# Patient Record
Sex: MALE | Race: WHITE | Employment: FULL TIME | URBAN - METROPOLITAN AREA
[De-identification: names, ages, dates, MRNs, and addresses within clinical notes are randomized per-mention and may not be internally consistent; named-entity substitution may affect disease eponyms.]

---

## 2018-09-06 ENCOUNTER — OFFICE VISIT (OUTPATIENT)
Dept: ORTHOPEDIC SURGERY | Age: 53
End: 2018-09-06

## 2018-09-06 VITALS
HEART RATE: 73 BPM | WEIGHT: 170 LBS | SYSTOLIC BLOOD PRESSURE: 128 MMHG | BODY MASS INDEX: 26.68 KG/M2 | HEIGHT: 67 IN | DIASTOLIC BLOOD PRESSURE: 76 MMHG

## 2018-09-06 DIAGNOSIS — M25.511 RIGHT SHOULDER PAIN, UNSPECIFIED CHRONICITY: ICD-10-CM

## 2018-09-06 DIAGNOSIS — S43.101A SEPARATION OF RIGHT ACROMIOCLAVICULAR JOINT, INITIAL ENCOUNTER: Primary | ICD-10-CM

## 2018-09-06 PROCEDURE — 99203 OFFICE O/P NEW LOW 30 MIN: CPT | Performed by: ORTHOPAEDIC SURGERY

## 2018-09-11 ENCOUNTER — OFFICE VISIT (OUTPATIENT)
Dept: ORTHOPEDIC SURGERY | Age: 53
End: 2018-09-11

## 2018-09-11 VITALS
BODY MASS INDEX: 26.68 KG/M2 | DIASTOLIC BLOOD PRESSURE: 70 MMHG | WEIGHT: 170 LBS | HEIGHT: 67 IN | HEART RATE: 70 BPM | SYSTOLIC BLOOD PRESSURE: 121 MMHG

## 2018-09-11 DIAGNOSIS — S43.101A SEPARATION OF RIGHT ACROMIOCLAVICULAR JOINT, INITIAL ENCOUNTER: ICD-10-CM

## 2018-09-11 DIAGNOSIS — M25.511 RIGHT SHOULDER PAIN, UNSPECIFIED CHRONICITY: Primary | ICD-10-CM

## 2018-09-11 PROCEDURE — L3670 SO ACRO/CLAV CAN WEB PRE OTS: HCPCS | Performed by: ORTHOPAEDIC SURGERY

## 2018-09-11 PROCEDURE — 99213 OFFICE O/P EST LOW 20 MIN: CPT | Performed by: ORTHOPAEDIC SURGERY
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
## 2018-09-20 ENCOUNTER — ANESTHESIA EVENT (OUTPATIENT)
Dept: OPERATING ROOM | Age: 53
End: 2018-09-20
Payer: COMMERCIAL

## 2018-09-20 NOTE — PROGRESS NOTES
PRE-OP INSTRUCTIONS FOR THE SURGICAL PATIENT YOU ARE UNABLE TO MAKE CONTACT FOR AN INTERVIEW:      1. Follow instructions for your ARRIVAL TIME as DIRECTED BY YOUR SURGEON. 2. Enter the MAIN entrance located on [address] and report to the desk. 3. Bring your insurance & prescription card and photo ID with you. You may also be asked to pay a co-pay, as you may want to bring a check or credit card with you. 4. Leave all other valuables at home. 5. Arrange for someone to drive you home and be with you for the first 24 hours after discharge. 6. You must contact your surgeon for ALL medication instructions, especially if taking blood thinners, aspirin, or diabetic medication. 7. A Pre-op History and Physical for surgery MUST be completed by your Physician or an Urgent Care within 30 days of your procedure date. Please bring a copy with you on the day of your procedure and along with any other testing performed. 8. DO NOT EAT OR DRINK ANYTHING AFTER MIDNIGHT, including gum, candy, mints or ice chips   9. Dress in loose, comfortable clothing appropriate for redressing after your procedure. Do not wear jewelry (including body piercings), make-up, fingernail polish, lotion, powders or metal hairclips. Contacts will need to be removed prior to surgery. 10. If you use a CPAP, please bring it with you on the day of your procedure. 11. Do not shave or wax for 72 hours prior to procedure near your operative site  12. FOR WOMAN OF CHILDBEARING AGE ONLY- please bring a urine sample with you on day of surgery or make sure we can collect on arrival.    If you have further questions, you may contact us at [phone]    Left instructions on patient's voicemail. Harika Vazquez. 9/20/2018 .7:37 AM

## 2018-09-21 ENCOUNTER — APPOINTMENT (OUTPATIENT)
Dept: GENERAL RADIOLOGY | Age: 53
End: 2018-09-21
Attending: ORTHOPAEDIC SURGERY
Payer: COMMERCIAL

## 2018-09-21 ENCOUNTER — ANESTHESIA (OUTPATIENT)
Dept: OPERATING ROOM | Age: 53
End: 2018-09-21
Payer: COMMERCIAL

## 2018-09-21 ENCOUNTER — HOSPITAL ENCOUNTER (OUTPATIENT)
Age: 53
Setting detail: OUTPATIENT SURGERY
Discharge: HOME | End: 2018-09-21
Attending: ORTHOPAEDIC SURGERY | Admitting: ORTHOPAEDIC SURGERY
Payer: COMMERCIAL

## 2018-09-21 VITALS
HEIGHT: 67 IN | SYSTOLIC BLOOD PRESSURE: 118 MMHG | HEART RATE: 52 BPM | RESPIRATION RATE: 15 BRPM | TEMPERATURE: 97.4 F | WEIGHT: 170 LBS | DIASTOLIC BLOOD PRESSURE: 76 MMHG | BODY MASS INDEX: 26.68 KG/M2 | OXYGEN SATURATION: 99 %

## 2018-09-21 VITALS
OXYGEN SATURATION: 100 % | DIASTOLIC BLOOD PRESSURE: 58 MMHG | SYSTOLIC BLOOD PRESSURE: 90 MMHG | RESPIRATION RATE: 16 BRPM | TEMPERATURE: 96.3 F

## 2018-09-21 DIAGNOSIS — Z98.890 S/P ARTHROSCOPY OF RIGHT SHOULDER: Primary | ICD-10-CM

## 2018-09-21 PROBLEM — S43.51XS: Status: ACTIVE | Noted: 2018-09-21

## 2018-09-21 PROCEDURE — 2720000010 HC SURG SUPPLY STERILE: Performed by: ORTHOPAEDIC SURGERY

## 2018-09-21 PROCEDURE — 94760 N-INVAS EAR/PLS OXIMETRY 1: CPT

## 2018-09-21 PROCEDURE — 73020 X-RAY EXAM OF SHOULDER: CPT

## 2018-09-21 PROCEDURE — 6360000002 HC RX W HCPCS: Performed by: NURSE ANESTHETIST, CERTIFIED REGISTERED

## 2018-09-21 PROCEDURE — 3600000004 HC SURGERY LEVEL 4 BASE: Performed by: ORTHOPAEDIC SURGERY

## 2018-09-21 PROCEDURE — 2580000003 HC RX 258: Performed by: ANESTHESIOLOGY

## 2018-09-21 PROCEDURE — 7100000001 HC PACU RECOVERY - ADDTL 15 MIN: Performed by: ORTHOPAEDIC SURGERY

## 2018-09-21 PROCEDURE — 2720000001 HC MISC SURG SUPPLY STERILE $51-500: Performed by: ORTHOPAEDIC SURGERY

## 2018-09-21 PROCEDURE — 94660 CPAP INITIATION&MGMT: CPT

## 2018-09-21 PROCEDURE — 2700000000 HC OXYGEN THERAPY PER DAY

## 2018-09-21 PROCEDURE — 6360000002 HC RX W HCPCS: Performed by: ANESTHESIOLOGY

## 2018-09-21 PROCEDURE — 2500000003 HC RX 250 WO HCPCS: Performed by: NURSE ANESTHETIST, CERTIFIED REGISTERED

## 2018-09-21 PROCEDURE — 2709999900 HC NON-CHARGEABLE SUPPLY: Performed by: ORTHOPAEDIC SURGERY

## 2018-09-21 PROCEDURE — 3209999900 FLUORO FOR SURGICAL PROCEDURES

## 2018-09-21 PROCEDURE — 76942 ECHO GUIDE FOR BIOPSY: CPT | Performed by: ANESTHESIOLOGY

## 2018-09-21 PROCEDURE — 7100000011 HC PHASE II RECOVERY - ADDTL 15 MIN: Performed by: ORTHOPAEDIC SURGERY

## 2018-09-21 PROCEDURE — 2580000003 HC RX 258: Performed by: ORTHOPAEDIC SURGERY

## 2018-09-21 PROCEDURE — 6360000002 HC RX W HCPCS: Performed by: ORTHOPAEDIC SURGERY

## 2018-09-21 PROCEDURE — 3700000000 HC ANESTHESIA ATTENDED CARE: Performed by: ORTHOPAEDIC SURGERY

## 2018-09-21 PROCEDURE — C1776 JOINT DEVICE (IMPLANTABLE): HCPCS | Performed by: ORTHOPAEDIC SURGERY

## 2018-09-21 PROCEDURE — 3700000001 HC ADD 15 MINUTES (ANESTHESIA): Performed by: ORTHOPAEDIC SURGERY

## 2018-09-21 PROCEDURE — L3650 SO 8 ABD RESTRAINT PRE OTS: HCPCS | Performed by: ORTHOPAEDIC SURGERY

## 2018-09-21 PROCEDURE — 7100000010 HC PHASE II RECOVERY - FIRST 15 MIN: Performed by: ORTHOPAEDIC SURGERY

## 2018-09-21 PROCEDURE — C9290 INJ, BUPIVACAINE LIPOSOME: HCPCS | Performed by: ANESTHESIOLOGY

## 2018-09-21 PROCEDURE — C1713 ANCHOR/SCREW BN/BN,TIS/BN: HCPCS | Performed by: ORTHOPAEDIC SURGERY

## 2018-09-21 PROCEDURE — 3600000014 HC SURGERY LEVEL 4 ADDTL 15MIN: Performed by: ORTHOPAEDIC SURGERY

## 2018-09-21 PROCEDURE — 7100000000 HC PACU RECOVERY - FIRST 15 MIN: Performed by: ORTHOPAEDIC SURGERY

## 2018-09-21 DEVICE — GRAFT HUM TISS ANT TIBIALIS TEND >22CM FRZN (FOLDED DIAM: Type: IMPLANTABLE DEVICE | Site: SHOULDER | Status: FUNCTIONAL

## 2018-09-21 DEVICE — KIT ENDOSCP 2MM TIGERTAPE LOOP 3MM CANN DRL SUTLASS SD WIRE: Type: IMPLANTABLE DEVICE | Site: SHOULDER | Status: FUNCTIONAL

## 2018-09-21 RX ORDER — ONDANSETRON 2 MG/ML
INJECTION INTRAMUSCULAR; INTRAVENOUS PRN
Status: DISCONTINUED | OUTPATIENT
Start: 2018-09-21 | End: 2018-09-21 | Stop reason: SDUPTHER

## 2018-09-21 RX ORDER — MIDAZOLAM HYDROCHLORIDE 1 MG/ML
INJECTION INTRAMUSCULAR; INTRAVENOUS
Status: COMPLETED
Start: 2018-09-21 | End: 2018-09-21

## 2018-09-21 RX ORDER — DEXAMETHASONE SODIUM PHOSPHATE 4 MG/ML
INJECTION, SOLUTION INTRA-ARTICULAR; INTRALESIONAL; INTRAMUSCULAR; INTRAVENOUS; SOFT TISSUE PRN
Status: DISCONTINUED | OUTPATIENT
Start: 2018-09-21 | End: 2018-09-21 | Stop reason: SDUPTHER

## 2018-09-21 RX ORDER — DOCUSATE SODIUM 100 MG/1
100 CAPSULE, LIQUID FILLED ORAL 2 TIMES DAILY
Qty: 40 CAPSULE | Refills: 0 | Status: SHIPPED | OUTPATIENT
Start: 2018-09-21 | End: 2018-11-01

## 2018-09-21 RX ORDER — DIPHENHYDRAMINE HYDROCHLORIDE 50 MG/ML
12.5 INJECTION INTRAMUSCULAR; INTRAVENOUS
Status: DISCONTINUED | OUTPATIENT
Start: 2018-09-21 | End: 2018-09-21 | Stop reason: HOSPADM

## 2018-09-21 RX ORDER — SODIUM CHLORIDE, SODIUM LACTATE, POTASSIUM CHLORIDE, CALCIUM CHLORIDE 600; 310; 30; 20 MG/100ML; MG/100ML; MG/100ML; MG/100ML
INJECTION, SOLUTION INTRAVENOUS CONTINUOUS
Status: DISCONTINUED | OUTPATIENT
Start: 2018-09-21 | End: 2018-09-21 | Stop reason: HOSPADM

## 2018-09-21 RX ORDER — LABETALOL HYDROCHLORIDE 5 MG/ML
5 INJECTION, SOLUTION INTRAVENOUS EVERY 10 MIN PRN
Status: DISCONTINUED | OUTPATIENT
Start: 2018-09-21 | End: 2018-09-21 | Stop reason: HOSPADM

## 2018-09-21 RX ORDER — OXYCODONE HYDROCHLORIDE 5 MG/1
10 TABLET ORAL PRN
Status: DISCONTINUED | OUTPATIENT
Start: 2018-09-21 | End: 2018-09-21 | Stop reason: HOSPADM

## 2018-09-21 RX ORDER — OXYCODONE HYDROCHLORIDE 5 MG/1
5 TABLET ORAL PRN
Status: DISCONTINUED | OUTPATIENT
Start: 2018-09-21 | End: 2018-09-21 | Stop reason: HOSPADM

## 2018-09-21 RX ORDER — HYDRALAZINE HYDROCHLORIDE 20 MG/ML
5 INJECTION INTRAMUSCULAR; INTRAVENOUS EVERY 10 MIN PRN
Status: DISCONTINUED | OUTPATIENT
Start: 2018-09-21 | End: 2018-09-21 | Stop reason: HOSPADM

## 2018-09-21 RX ORDER — METOCLOPRAMIDE HYDROCHLORIDE 5 MG/ML
10 INJECTION INTRAMUSCULAR; INTRAVENOUS
Status: DISCONTINUED | OUTPATIENT
Start: 2018-09-21 | End: 2018-09-21 | Stop reason: HOSPADM

## 2018-09-21 RX ORDER — MORPHINE SULFATE 2 MG/ML
1 INJECTION, SOLUTION INTRAMUSCULAR; INTRAVENOUS EVERY 5 MIN PRN
Status: DISCONTINUED | OUTPATIENT
Start: 2018-09-21 | End: 2018-09-21 | Stop reason: HOSPADM

## 2018-09-21 RX ORDER — ROCURONIUM BROMIDE 10 MG/ML
INJECTION, SOLUTION INTRAVENOUS PRN
Status: DISCONTINUED | OUTPATIENT
Start: 2018-09-21 | End: 2018-09-21 | Stop reason: SDUPTHER

## 2018-09-21 RX ORDER — OXYCODONE HYDROCHLORIDE AND ACETAMINOPHEN 5; 325 MG/1; MG/1
1 TABLET ORAL EVERY 4 HOURS PRN
Qty: 42 TABLET | Refills: 0 | Status: SHIPPED | OUTPATIENT
Start: 2018-09-21 | End: 2018-09-28

## 2018-09-21 RX ORDER — MIDAZOLAM HYDROCHLORIDE 1 MG/ML
INJECTION INTRAMUSCULAR; INTRAVENOUS PRN
Status: DISCONTINUED | OUTPATIENT
Start: 2018-09-21 | End: 2018-09-21 | Stop reason: SDUPTHER

## 2018-09-21 RX ORDER — GLYCOPYRROLATE 1 MG/5 ML
SYRINGE (ML) INTRAVENOUS PRN
Status: DISCONTINUED | OUTPATIENT
Start: 2018-09-21 | End: 2018-09-21 | Stop reason: SDUPTHER

## 2018-09-21 RX ORDER — PROMETHAZINE HYDROCHLORIDE 25 MG/ML
6.25 INJECTION, SOLUTION INTRAMUSCULAR; INTRAVENOUS
Status: DISCONTINUED | OUTPATIENT
Start: 2018-09-21 | End: 2018-09-21 | Stop reason: HOSPADM

## 2018-09-21 RX ORDER — MEPERIDINE HYDROCHLORIDE 25 MG/ML
12.5 INJECTION INTRAMUSCULAR; INTRAVENOUS; SUBCUTANEOUS EVERY 5 MIN PRN
Status: DISCONTINUED | OUTPATIENT
Start: 2018-09-21 | End: 2018-09-21 | Stop reason: HOSPADM

## 2018-09-21 RX ORDER — LIDOCAINE HYDROCHLORIDE 20 MG/ML
INJECTION, SOLUTION EPIDURAL; INFILTRATION; INTRACAUDAL; PERINEURAL PRN
Status: DISCONTINUED | OUTPATIENT
Start: 2018-09-21 | End: 2018-09-21 | Stop reason: SDUPTHER

## 2018-09-21 RX ORDER — FENTANYL CITRATE 50 UG/ML
INJECTION, SOLUTION INTRAMUSCULAR; INTRAVENOUS PRN
Status: DISCONTINUED | OUTPATIENT
Start: 2018-09-21 | End: 2018-09-21 | Stop reason: SDUPTHER

## 2018-09-21 RX ORDER — SODIUM CHLORIDE 0.9 % (FLUSH) 0.9 %
10 SYRINGE (ML) INJECTION PRN
Status: DISCONTINUED | OUTPATIENT
Start: 2018-09-21 | End: 2018-09-21 | Stop reason: HOSPADM

## 2018-09-21 RX ORDER — PROPOFOL 10 MG/ML
INJECTION, EMULSION INTRAVENOUS PRN
Status: DISCONTINUED | OUTPATIENT
Start: 2018-09-21 | End: 2018-09-21 | Stop reason: SDUPTHER

## 2018-09-21 RX ORDER — LIDOCAINE HYDROCHLORIDE 10 MG/ML
1 INJECTION, SOLUTION EPIDURAL; INFILTRATION; INTRACAUDAL; PERINEURAL
Status: DISCONTINUED | OUTPATIENT
Start: 2018-09-21 | End: 2018-09-21 | Stop reason: HOSPADM

## 2018-09-21 RX ORDER — FENTANYL CITRATE 50 UG/ML
INJECTION, SOLUTION INTRAMUSCULAR; INTRAVENOUS
Status: COMPLETED
Start: 2018-09-21 | End: 2018-09-21

## 2018-09-21 RX ORDER — SODIUM CHLORIDE 0.9 % (FLUSH) 0.9 %
10 SYRINGE (ML) INJECTION EVERY 12 HOURS SCHEDULED
Status: DISCONTINUED | OUTPATIENT
Start: 2018-09-21 | End: 2018-09-21 | Stop reason: HOSPADM

## 2018-09-21 RX ADMIN — FENTANYL CITRATE 100 MCG: 50 INJECTION INTRAMUSCULAR; INTRAVENOUS at 10:00

## 2018-09-21 RX ADMIN — PHENYLEPHRINE HYDROCHLORIDE 100 MCG: 10 INJECTION, SOLUTION INTRAMUSCULAR; INTRAVENOUS; SUBCUTANEOUS at 11:47

## 2018-09-21 RX ADMIN — Medication 2 G: at 11:34

## 2018-09-21 RX ADMIN — LIDOCAINE HYDROCHLORIDE 100 MG: 20 INJECTION, SOLUTION EPIDURAL; INFILTRATION; INTRACAUDAL; PERINEURAL at 11:21

## 2018-09-21 RX ADMIN — SODIUM CHLORIDE, SODIUM LACTATE, POTASSIUM CHLORIDE, AND CALCIUM CHLORIDE: 600; 310; 30; 20 INJECTION, SOLUTION INTRAVENOUS at 11:38

## 2018-09-21 RX ADMIN — PROPOFOL 150 MG: 10 INJECTION, EMULSION INTRAVENOUS at 11:21

## 2018-09-21 RX ADMIN — SODIUM CHLORIDE, SODIUM LACTATE, POTASSIUM CHLORIDE, AND CALCIUM CHLORIDE: 600; 310; 30; 20 INJECTION, SOLUTION INTRAVENOUS at 13:34

## 2018-09-21 RX ADMIN — Medication 0.2 MG: at 11:18

## 2018-09-21 RX ADMIN — MIDAZOLAM HYDROCHLORIDE 2 MG: 1 INJECTION INTRAMUSCULAR; INTRAVENOUS at 11:15

## 2018-09-21 RX ADMIN — ROCURONIUM BROMIDE 10 MG: 10 INJECTION, SOLUTION INTRAVENOUS at 11:21

## 2018-09-21 RX ADMIN — MIDAZOLAM HYDROCHLORIDE 2 MG: 1 INJECTION INTRAMUSCULAR; INTRAVENOUS at 10:00

## 2018-09-21 RX ADMIN — FENTANYL CITRATE 50 MCG: 50 INJECTION INTRAMUSCULAR; INTRAVENOUS at 13:34

## 2018-09-21 RX ADMIN — BUPIVACAINE 10 ML: 13.3 INJECTION, SUSPENSION, LIPOSOMAL INFILTRATION at 10:02

## 2018-09-21 RX ADMIN — ROCURONIUM BROMIDE 20 MG: 10 INJECTION, SOLUTION INTRAVENOUS at 12:56

## 2018-09-21 RX ADMIN — ROCURONIUM BROMIDE 40 MG: 10 INJECTION, SOLUTION INTRAVENOUS at 11:22

## 2018-09-21 RX ADMIN — ONDANSETRON 4 MG: 2 INJECTION INTRAMUSCULAR; INTRAVENOUS at 13:26

## 2018-09-21 RX ADMIN — SODIUM CHLORIDE, SODIUM LACTATE, POTASSIUM CHLORIDE, AND CALCIUM CHLORIDE: 600; 310; 30; 20 INJECTION, SOLUTION INTRAVENOUS at 09:20

## 2018-09-21 RX ADMIN — SUGAMMADEX 150 MG: 100 INJECTION, SOLUTION INTRAVENOUS at 13:36

## 2018-09-21 RX ADMIN — DEXAMETHASONE SODIUM PHOSPHATE 8 MG: 4 INJECTION, SOLUTION INTRAMUSCULAR; INTRAVENOUS at 13:26

## 2018-09-21 ASSESSMENT — PULMONARY FUNCTION TESTS
PIF_VALUE: 17
PIF_VALUE: 16
PIF_VALUE: 17
PIF_VALUE: 16
PIF_VALUE: 1
PIF_VALUE: 17
PIF_VALUE: 3
PIF_VALUE: 17
PIF_VALUE: 1
PIF_VALUE: 15
PIF_VALUE: 17
PIF_VALUE: 17
PIF_VALUE: 14
PIF_VALUE: 2
PIF_VALUE: 17
PIF_VALUE: 15
PIF_VALUE: 17
PIF_VALUE: 16
PIF_VALUE: 16
PIF_VALUE: 5
PIF_VALUE: 15
PIF_VALUE: 17
PIF_VALUE: 16
PIF_VALUE: 14
PIF_VALUE: 14
PIF_VALUE: 16
PIF_VALUE: 17
PIF_VALUE: 16
PIF_VALUE: 4
PIF_VALUE: 16
PIF_VALUE: 14
PIF_VALUE: 5
PIF_VALUE: 15
PIF_VALUE: 17
PIF_VALUE: 14
PIF_VALUE: 17
PIF_VALUE: 2
PIF_VALUE: 16
PIF_VALUE: 17
PIF_VALUE: 15
PIF_VALUE: 16
PIF_VALUE: 17
PIF_VALUE: 16
PIF_VALUE: 16
PIF_VALUE: 17
PIF_VALUE: 11
PIF_VALUE: 17
PIF_VALUE: 17
PIF_VALUE: 14
PIF_VALUE: 16
PIF_VALUE: 17
PIF_VALUE: 8
PIF_VALUE: 16
PIF_VALUE: 16
PIF_VALUE: 17
PIF_VALUE: 15
PIF_VALUE: 17
PIF_VALUE: 1
PIF_VALUE: 17
PIF_VALUE: 16
PIF_VALUE: 16
PIF_VALUE: 17
PIF_VALUE: 16
PIF_VALUE: 17
PIF_VALUE: 16
PIF_VALUE: 15
PIF_VALUE: 16
PIF_VALUE: 15
PIF_VALUE: 17
PIF_VALUE: 16
PIF_VALUE: 16
PIF_VALUE: 17
PIF_VALUE: 4
PIF_VALUE: 17
PIF_VALUE: 16
PIF_VALUE: 17
PIF_VALUE: 15
PIF_VALUE: 2
PIF_VALUE: 16
PIF_VALUE: 2
PIF_VALUE: 20
PIF_VALUE: 17
PIF_VALUE: 17
PIF_VALUE: 14
PIF_VALUE: 16
PIF_VALUE: 5
PIF_VALUE: 2
PIF_VALUE: 17
PIF_VALUE: 16
PIF_VALUE: 16
PIF_VALUE: 2
PIF_VALUE: 2
PIF_VALUE: 16
PIF_VALUE: 17
PIF_VALUE: 15
PIF_VALUE: 17
PIF_VALUE: 1
PIF_VALUE: 15
PIF_VALUE: 1
PIF_VALUE: 16
PIF_VALUE: 17
PIF_VALUE: 18
PIF_VALUE: 17
PIF_VALUE: 16
PIF_VALUE: 17
PIF_VALUE: 16
PIF_VALUE: 16
PIF_VALUE: 15
PIF_VALUE: 17
PIF_VALUE: 15
PIF_VALUE: 17
PIF_VALUE: 17
PIF_VALUE: 16
PIF_VALUE: 15
PIF_VALUE: 17
PIF_VALUE: 14
PIF_VALUE: 14
PIF_VALUE: 2
PIF_VALUE: 17
PIF_VALUE: 16
PIF_VALUE: 17
PIF_VALUE: 17
PIF_VALUE: 18
PIF_VALUE: 4
PIF_VALUE: 15
PIF_VALUE: 2
PIF_VALUE: 16

## 2018-09-21 ASSESSMENT — PAIN SCALES - GENERAL
PAINLEVEL_OUTOF10: 0

## 2018-09-21 ASSESSMENT — PAIN DESCRIPTION - DESCRIPTORS: DESCRIPTORS: ACHING

## 2018-09-21 ASSESSMENT — PAIN - FUNCTIONAL ASSESSMENT: PAIN_FUNCTIONAL_ASSESSMENT: 0-10

## 2018-09-21 NOTE — ANESTHESIA PROCEDURE NOTES
Peripheral Block    Patient location during procedure: pre-op  Staffing  Anesthesiologist: Hannah Ash  Performed: anesthesiologist   Preanesthetic Checklist  Completed: patient identified, site marked, surgical consent, pre-op evaluation, timeout performed, IV checked, risks and benefits discussed, monitors and equipment checked, anesthesia consent given, oxygen available and patient being monitored  Peripheral Block  Patient position: sitting  Prep: ChloraPrep  Patient monitoring: cardiac monitor, continuous pulse ox, frequent blood pressure checks and IV access  Block type: Brachial plexus  Laterality: right  Injection technique: single-shot  Procedures: ultrasound guided  Interscalene  Provider prep: mask and sterile gloves  Needle  Needle gauge: 22 G  Needle length: 8 cm  Needle localization: ultrasound guidance  Assessment  Injection assessment: negative aspiration for heme, no paresthesia on injection and local visualized surrounding nerve on ultrasound  Paresthesia pain: none  Slow fractionated injection: yes  Hemodynamics: stable  Additional Notes  Right Ultrasound-Guided Interscalene Brachial Plexus Block    Indication: Postoperative analgesia upon request of the attending surgeon. Procedure: Informed consent obtained and pre-procedural timeout procedure performed. Patient supine in semi-upright position. Landmarks identified. Sterile prep. Right brachial plexus was identified using an ultrasound probe and an 80mm 22G insulated regional block needle was inserted posterior to the right interscalene groove at the level of the C6 tubercle and directed in an anterior manner toward the brachial plexus. The needle was visualized on ultrasound as it entered the plexus sheath. Exparel 10cc was diluted with normal saline 20cc and was then injected in 5cc increments to a total volume of 30cc after aspiration was performed prior to each increment and found to be negative for both heme and CSF.   Block was tolerated well by the patient. There were no apparent complications at the time of the block. Reason for block: post-op pain management and at surgeon's request            Supriya Hill.  Mason Henry MD  September 21, 2018 10:31 AM

## 2018-09-21 NOTE — ANESTHESIA PRE PROCEDURE
Department of Anesthesiology  Preprocedure Note       Name:  Je Husbands   Age:  45 y.o.  :  1965                                          MRN:  [id number]         Date:  2018      Surgeon: Edward Bynum):  Sampson Arguello MD    Procedure: Procedure(s):  RIGHT SHOULDER ARTHROSCOPY, ARTHROSCOPIC DEBRIDEMENT ACROMIOCLAVICULAR JOINT RESECTION WITH ALLOGRAFT    Medications prior to admission:   Prior to Admission medications    Not on File       Current medications:    Current Facility-Administered Medications   Medication Dose Route Frequency Provider Last Rate Last Dose    HYDROmorphone (DILAUDID) injection 0.25 mg  0.25 mg Intravenous Q5 Min PRN Cass Velasco MD        HYDROmorphone (DILAUDID) injection 0.5 mg  0.5 mg Intravenous Q5 Min PRN Cass Velasco MD        morphine injection 1 mg  1 mg Intravenous Q5 Min PRN Cass Velasco MD        HYDROmorphone (DILAUDID) injection 0.5 mg  0.5 mg Intravenous Q5 Min PRN Cass Velasco MD        oxyCODONE (ROXICODONE) immediate release tablet 5 mg  5 mg Oral PRN Cass Velasco MD        Or    oxyCODONE (ROXICODONE) immediate release tablet 10 mg  10 mg Oral PRN Cass Velasco MD        diphenhydrAMINE (BENADRYL) injection 12.5 mg  12.5 mg Intravenous Once PRN Cass Velasco MD        metoclopramide (REGLAN) injection 10 mg  10 mg Intravenous Once PRN Cass Velasco MD        promethazine (PHENERGAN) injection 6.25 mg  6.25 mg Intravenous Once PRN Cass Velasco MD        labetalol (NORMODYNE;TRANDATE) injection 5 mg  5 mg Intravenous Q10 Min PRN Cass Velasco MD        hydrALAZINE (APRESOLINE) injection 5 mg  5 mg Intravenous Q10 Min PRN Cass Velasco MD        meperidine (DEMEROL) injection 12.5 mg  12.5 mg Intravenous Q5 Min PRN Cass Velasco MD           Allergies:  No Known Allergies    Problem List:    Patient Active Problem List   Diagnosis Code    S/P arthroscopy of right shoulder Z98.890       Past Medical History:  No past medical history on file. Past Surgical History:        Procedure Laterality Date    HIP SURGERY      JOINT REPLACEMENT Right     THR    SHOULDER ARTHROSCOPY      SHOULDER SURGERY Left     RCR       Social History:    Social History   Substance Use Topics    Smoking status: Never Smoker    Smokeless tobacco: Never Used    Alcohol use No                                Counseling given: Not Answered      Vital Signs (Current):   Vitals:    09/20/18 0739 09/21/18 0829   BP:  117/74   Pulse:  68   Resp:  18   Temp:  98.2 °F (36.8 °C)   TempSrc:  Oral   SpO2:  100%   Weight: 170 lb (77.1 kg) 170 lb (77.1 kg)   Height: 5' 7\" (1.702 m) 5' 7\" (1.702 m)                                              BP Readings from Last 3 Encounters:   09/21/18 117/74   09/11/18 121/70   09/06/18 128/76       NPO Status:                                                                                 BMI:   Wt Readings from Last 3 Encounters:   09/21/18 170 lb (77.1 kg)   09/11/18 170 lb (77.1 kg)   09/06/18 170 lb (77.1 kg)     Body mass index is 26.63 kg/m². CBC: No results found for: WBC, RBC, HGB, HCT, MCV, RDW, PLT    CMP: No results found for: NA, K, CL, CO2, BUN, CREATININE, GFRAA, AGRATIO, LABGLOM, GLUCOSE, PROT, CALCIUM, BILITOT, ALKPHOS, AST, ALT    POC Tests: No results for input(s): POCGLU, POCNA, POCK, POCCL, POCBUN, POCHEMO, POCHCT in the last 72 hours.     Coags: No results found for: PROTIME, INR, APTT    HCG (If Applicable): No results found for: PREGTESTUR, PREGSERUM, HCG, HCGQUANT     ABGs: No results found for: PHART, PO2ART, VUJ1ZXA, NRW2JWD, BEART, F4DUQJJU     Type & Screen (If Applicable):  No results found for: LABABO, [address]    Anesthesia Evaluation  Patient summary reviewed and Nursing notes reviewed no history of anesthetic complications:   Airway: Mallampati: II  TM distance: >3 FB   Neck ROM: full  Mouth opening: > = 3 FB Dental:          Pulmonary:Negative Pulmonary ROS Cardiovascular:Negative CV ROS                      Neuro/Psych:   Negative Neuro/Psych ROS              GI/Hepatic/Renal: Neg GI/Hepatic/Renal ROS            Endo/Other: Negative Endo/Other ROS                    Abdominal:           Vascular: negative vascular ROS. Anesthesia Plan      general     ASA 3    (47-year-old male presents for RIGHT SHOULDER ARTHROSCOPY, ARTHROSCOPIC DEBRIDEMENT ACROMIOCLAVICULAR JOINT RESECTION WITH ALLOGRAFT. Plan general anesthesia with ASA standard monitors; interscalene brachial plexus block for postoperative analgesia upon request of the attending surgeon. Questions answered. Patient agreeable with anesthetic plan.   )  Induction: intravenous. Anesthetic plan and risks discussed with patient. Plan discussed with CRNA.     Attending anesthesiologist reviewed and agrees with Pre Eval content      John Garcia MD   9/21/2018
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
## 2018-09-21 NOTE — PROGRESS NOTES
Admitted to PACU bed 9. Placed on cardiac monitor. Report received from CRNA. No unusual events noted from CRNA.

## 2018-09-21 NOTE — PROGRESS NOTES
Ambulatory Surgery/Procedure Discharge Note    Vitals:    09/21/18 1506   BP: 118/76   Pulse: 52   Resp: 15   Temp: 97.4 °F (36.3 °C)   SpO2: 99%       No intake/output data recorded. Pain assessment:   Pain Level: 0    Patient discharged to home/self care. Patient discharged via wheel chair by transporter to waiting family/S.O. Discharge instructions along with medication instructions and side effects given to patient and wife. Verbalized understanding. Denies nausea. Pain controlled. Tolerating po fluids. Denies need to void at this time. Feels ready to go home.       9/21/2018 3:08 PM

## 2018-09-22 NOTE — OP NOTE
[address] [facility], [address] [facility]                                 OPERATIVE REPORT    PATIENT NAME: Emma Valencia              :        1965  MED REC NO:   [id number]                          ROOM:  ACCOUNT NO:   [de-identified]                           ADMIT DATE: 2018  PROVIDER:     Amy Palma MD    DATE OF PROCEDURE:  2018    PREOPERATIVE DIAGNOSIS:  Grade IIIB AC joint separation, right elbow. POSTOPERATIVE DIAGNOSIS:  Grade IIIB AC joint separation, right elbow with  partial thickness supraspinatus tendon tear. OPERATIONS PERFORMED:  Right shoulder examination under anesthesia;  diagnostic arthroscopy; arthroscopic extensive debridement of rotator  interval and rotator cuff; arthroscopic assisted AC joint reconstruction  using EndoButton, TightRope type construct with augmentation using  allograft, anterior tibialis tendon. ANESTHESIA:  General anesthesia, interscalene block. IV FLUIDS:  Per anesthesia record. ESTIMATED BLOOD LOSS:  25 mL. COMPLICATIONS:  None. SURGEON:  Amy Palma MD    ASSISTANT:  Clarissa Foreman DO    IMPLANTS:  Dog Bone from [employer] x2. FINDINGS:  Examination under anesthesia revealed ballotable and reducible  grade III AC separation. It seemed to be displaced a little bit posterior  through the deltotrapezial fascia. Diagnostic arthroscopy revealed a low  grade and a medium grade supraspinatus tendon tear measuring about 1 cm,  amenable to debridement. There was not a high grade tear. A little bit of  labral fraying was also debrided. The rotator interval was scarred and we  could open up. We had excellent exposure of the base of coracoid and  labral tear, AC joint reconstruction and reduced it. We were able to pass  a graft cerclage around the clavicle and around the cord. He had very good  fixation and augmentation.   There were no other obvious introduced into the glenohumeral joint through a standard  posterior portal.  Systematic diagnostic arthroscopy was performed with  findings as above. We established an anterior portal over the rotator  interval.  We inserted our arthroscopic shaver across a metal cannula. This was used to debride. We probed the biceps, then we did labral  debridement. We also debrided the rotator cuff where there was some  supraspinatus fraying. This was done down to a stable margin. We used  cautery to penny the exposed footprint, it was about 4 to 5 mm. There was  no gross biceps instability. We used cautery, opened up the rotator  interval down to the base of the coracoid and then also began resecting the  scar and bursa underneath the coracoid. We switched to a 70-degree  arthroscope to complete our preparation down to the base of the coracoid. We had excellent fixation, we had excellent exposure, and we removed all  the debris. We then made a 3-cm longitudinal incision, about 3 cm medial  to the [facility] joint. This was down through skin and subcutaneous tissue. Electrocautery was used to establish hemostasis. Full-thickness  subcutaneous flaps were developed. The deltotrapezial fascia was incised  longitudinally in line with the long axis of the clavicle. This was done a  little bit more anteriorly, so was centered over the clavicle. We  subperiosteally exposed the clavicle medially and laterally. We could get  to 40 cm medial to the [facility] joint. We now could put our ACL guide in the  appropriate orientation. We dialed down the guide until we could place the  guide over the clavicle mid center and so have the foot of the guide  underneath the coracoid and centered. We then drilled through both  cortices of the clavicle and both cortices of coracoid exiting right and  middle of the coracoid. The drill was a cannulated drill, so we removed  the center and left the drill. It was right where we wanted it to be. We  then passed the loop wires from top to bottom, retrieved them at the  anterior portal and used that to load the four-string Dog Bone construct. The other end of the wire was used to deliver the sutures in a retrograde  fashion. We were able to sit the Dog Bone perfectly. The sutures were  then opened up where they were swedged and passed through the second Dog  Bone which was then placed right over the collar bone. We over-reduced the  [facility] joint separation, tied the knot sequentially. We had an excellent  construct, very happy with how things looked. We documented our work using  fluoroscopy. We then dilated behind the clavicle and medial to where the  Dog Bone was placed, this was done with a switching-stick until we could  see the switching-stick right medial to the coracoid. Again, a 70-degree  scope was used and we were able to clear up some of the debris with the  shaver and the cautery device. We then dilated and removed the switching  stick. With the dilator in place, we passed the FiberStick suture,  retrieved that suture. This was used to shuttle the graft. The graft  itself had been soaking in saline and we had used two fiber loops, one on  each side to tuberize the graft. The graft was now delivered through them  from the back of the clavicle around the medial end of the coracoid. We  then used our switching-stick on the anterior aspect of the lateral  clavicle and exiting lateral to the coracoid. Again, we used the dilator  and passed another FiberStick and then retrieved that at the anterior  portal, that [place] was used to deliver the graft back up around the  anterior clavicle. We now had to cerclage the graft. We sized it, crossed  them over and tied them with multiple #2 FiberWire sutures. We tried to  secure this as close as possible and around 4 cm medial to the [facility] joint.    The Dog Bone tails were not cut and some of these were then passed through  the graft and then tied over, another tail was passed through the  deltotrapezial fascia. We had multiple layers closed including the graft  itself and also to the adjoining tissue. The deltotrapezial fascia was  closed using additional sutures including 0 Vicryl in a figure-of-eight  fashion, had a very nice closure. Subcutaneous tissue was closed using 2-0  Vicryl in interrupted inverted fashion. We irrigated well. We closed the  skin using 4-0 Monocryl subcuticular closure and Steri-Strips. These were  also used to close the anterior and posterior portals. Sterile compressive  dressing was applied. The arm was placed in a padded soft brace. The  patient was repositioned in the supine position, promptly awakened from  anesthesia, having tolerated the procedure well and taken from the  operating room to the recovery room in satisfactory condition. PLAN:  The plan will be discharged on oral analgesics with instructions to  keep the arm in brace. He will follow up in the office in one week. He  will have to be in a protected program to protect the [facility] joint  reconstruction while it heals.         Nasrin Zacarias MD    D: 09/21/2018 16:34:34       T: 09/22/2018 2:14:13     [id number]  Job#: [id number]     Doc#: [id number]    CC:

## 2018-09-27 ENCOUNTER — OFFICE VISIT (OUTPATIENT)
Dept: ORTHOPEDIC SURGERY | Age: 53
End: 2018-09-27

## 2018-09-27 VITALS
BODY MASS INDEX: 26.68 KG/M2 | SYSTOLIC BLOOD PRESSURE: 136 MMHG | DIASTOLIC BLOOD PRESSURE: 89 MMHG | HEART RATE: 76 BPM | WEIGHT: 170 LBS | HEIGHT: 67 IN

## 2018-09-27 DIAGNOSIS — Z98.890 S/P SHOULDER SURGERY: Primary | ICD-10-CM

## 2018-09-27 PROCEDURE — 99024 POSTOP FOLLOW-UP VISIT: CPT | Performed by: ORTHOPAEDIC SURGERY

## 2018-09-27 RX ORDER — HYDROCODONE BITARTRATE AND ACETAMINOPHEN 5; 325 MG/1; MG/1
1 TABLET ORAL EVERY 6 HOURS PRN
Qty: 28 TABLET | Refills: 0 | Status: SHIPPED | OUTPATIENT
Start: 2018-09-27 | End: 2018-09-27 | Stop reason: CLARIF

## 2018-09-27 NOTE — PROGRESS NOTES
[address] Note    History of Present Illness:  [place] is a 45 y.o. male presenting to the clinic today for postoperative visit status post right acromioclavicular joint reconstruction from a type 3B acromioclavicular joint sprain. He is doing very well. He is having a little bit of numbness in his thumb and index finger. His right shoulder pain is well controlled. No fevers or chills. Medical History:  Patient's medications, allergies, past medical, surgical, social and family histories were reviewed and updated as appropriate. They are as noted. Social History     Social History    Marital status:      Spouse name: N/A    Number of children: N/A    Years of education: N/A     Occupational History    Not on file. Social History Main Topics    Smoking status: Never Smoker    Smokeless tobacco: Never Used    Alcohol use No    Drug use: No    Sexual activity: Not on file     Other Topics Concern    Not on file     Social History Narrative    No narrative on file       No Known Allergies  Current Outpatient Prescriptions on File Prior to Visit   Medication Sig Dispense Refill    oxyCODONE-acetaminophen (PERCOCET) 5-325 MG per tablet Take 1 tablet by mouth every 4 hours as needed for Pain for up to 7 days. Intended supply: 7 days. Take lowest dose possible to manage pain. Earliest Fill Date: 9/21/18 42 tablet 0    docusate sodium (COLACE) 100 MG capsule Take 1 capsule by mouth 2 times daily 40 capsule 0     No current facility-administered medications on file prior to visit. family history includes High Blood Pressure in his mother. History reviewed. No pertinent past medical history. History reviewed. No pertinent past medical history.   Active Ambulatory Problems     Diagnosis Date Noted    S/P arthroscopy of right shoulder 09/21/2018    Acromioclavicular sprain, right, sequela 09/21/2018     Resolved Ambulatory Problems     Diagnosis Date Noted    No Resolved Ambulatory Problems     No Additional Past Medical History       Review of Systems:  Nate Benson reported review of systems has been reviewed and has been scanned into his medical record for today's visit. The scanned image can be found in media images folder. He was instructed to contact his primary care physician regarding ROS positives if not already being addressed during today's visit. Height: 5' 7\" (170.2 cm), Weight: 170 lb (77.1 kg), Body mass index is 26.63 kg/m².,  Relevant review of systems reviewed and available in the patient's chart. All pertinent systems are reviewed and positives noted. Vital Signs:  Vitals:    09/27/18 0855   BP: 136/89   Pulse: 76     Height: 5' 7\" (170.2 cm), Weight: 170 lb (77.1 kg), Body mass index is 26.63 kg/m². ,      Physical Exam:     Appearance: Nate Benson is alert, oriented x 3, in no apparent distress, and well groomed. Right shoulder exam: His incision is healing well without signs of surrounding erythema, drainage or wrist infection. Range of motion was not tested due to postoperative status    Strength not tested due to postoperative status    Axillary, musculocutaneous, Median, ulnar, radial, AIN, PIN are intact to motor and sensation. He has subjective numbness in the thumb and index finger. H has a palpable radial pulse, and the hand is well-perfused. Office Procedures:  Orders Placed This Encounter   Procedures    XR Shoulder Right 1 VW     Scheduling Instructions:      AC joint view (Zanca, adjust KVs)     Order Specific Question:   Reason for exam:     Answer:   AC joint pain     Imaging: One view of the acromioclavicular joint was obtained and reviewed and demonstrate satisfactory postoperative changes status post acromioclavicular joint reconstruction. There is approximately 2 mm of superior clavicle elevation compared to the acromion.   No evidence of hardware

## 2018-10-02 ENCOUNTER — TELEPHONE (OUTPATIENT)
Dept: ORTHOPEDIC SURGERY | Age: 53
End: 2018-10-02

## 2018-10-09 ENCOUNTER — OFFICE VISIT (OUTPATIENT)
Dept: ORTHOPEDIC SURGERY | Age: 53
End: 2018-10-09

## 2018-10-09 VITALS
DIASTOLIC BLOOD PRESSURE: 85 MMHG | BODY MASS INDEX: 26.68 KG/M2 | HEIGHT: 67 IN | SYSTOLIC BLOOD PRESSURE: 126 MMHG | WEIGHT: 170 LBS | HEART RATE: 70 BPM

## 2018-10-09 DIAGNOSIS — Z98.890 S/P ARTHROSCOPY OF RIGHT SHOULDER: Primary | ICD-10-CM

## 2018-10-09 PROCEDURE — 99024 POSTOP FOLLOW-UP VISIT: CPT | Performed by: ORTHOPAEDIC SURGERY

## 2018-10-20 NOTE — PROGRESS NOTES
History of Present Illness:  Cris for follow-up of his right shoulder. He is 2-1/2 weeks out following arthroscopic stabilization for a type IIIB AC joint. Separation. He's been feeling really good. He is happy with how his shoulder feels. He is ramping up in physical therapy. He is working with Sandro Moreira, with whom he has worked before. He would like to come out of the brace and do more with his arm. He is also starting to think about returning back to work. Medical History:  Patient's medications, allergies, past medical, surgical, social and family histories were reviewed and updated as appropriate. Pertinent items are noted in HPI  Review of systems reviewed from Patient History Form dated on September 6, 2018 and available in the patient's chart under the Media tab. Vital Signs:  Vitals:    10/09/18 1532   BP: 126/85   Pulse: 70     Constitutional: He is in no apparent distress. Shoulder Examination:    Inspection:  Posture is good. The wounds look great. No signs of an infection. Palpation:  No palpable step-off. Active Range of Motion: Deferred. Passive Range of Motion:  Active assisted elevation is to 70. External rotation to 20. Strength:  Deferred. Special Tests:  Distal neurovascular exam is intact. Radiology:     Plain radiographs were not obtained today. Assessment :  My impression is that Nadeem Montano is doing very well. He is ramping up on target. We will start to wean him out of the brace. He is pleased to hear this. Impression:  Encounter Diagnosis   Name Primary?     S/P arthroscopy of right shoulder Yes       Office Procedures:  Orders Placed This Encounter   Procedures    Amb External Referral To Physical Therapy     Referral Priority:   Routine     Referral Type:   Consult for Advice and Opinion     Referral Reason:   Patient Preference     Requested Specialty:   Physical Therapy     Number of Visits Requested:

## 2018-10-29 ENCOUNTER — TELEPHONE (OUTPATIENT)
Dept: ORTHOPEDIC SURGERY | Age: 53
End: 2018-10-29

## 2018-11-01 ENCOUNTER — OFFICE VISIT (OUTPATIENT)
Dept: ORTHOPEDIC SURGERY | Age: 53
End: 2018-11-01

## 2018-11-01 VITALS
HEIGHT: 67 IN | BODY MASS INDEX: 26.68 KG/M2 | WEIGHT: 169.97 LBS | SYSTOLIC BLOOD PRESSURE: 132 MMHG | HEART RATE: 68 BPM | DIASTOLIC BLOOD PRESSURE: 82 MMHG

## 2018-11-01 DIAGNOSIS — M25.511 RIGHT SHOULDER PAIN, UNSPECIFIED CHRONICITY: ICD-10-CM

## 2018-11-01 DIAGNOSIS — Z98.890 S/P ARTHROSCOPY OF RIGHT SHOULDER: Primary | ICD-10-CM

## 2018-11-01 PROCEDURE — 99024 POSTOP FOLLOW-UP VISIT: CPT | Performed by: PHYSICIAN ASSISTANT

## 2018-11-15 ENCOUNTER — OFFICE VISIT (OUTPATIENT)
Dept: ORTHOPEDIC SURGERY | Age: 53
End: 2018-11-15

## 2018-11-15 VITALS
HEART RATE: 73 BPM | BODY MASS INDEX: 26.68 KG/M2 | SYSTOLIC BLOOD PRESSURE: 133 MMHG | HEIGHT: 67 IN | WEIGHT: 169.97 LBS | DIASTOLIC BLOOD PRESSURE: 83 MMHG

## 2018-11-15 DIAGNOSIS — M25.511 RIGHT SHOULDER PAIN, UNSPECIFIED CHRONICITY: ICD-10-CM

## 2018-11-15 DIAGNOSIS — Z98.890 S/P ARTHROSCOPY OF RIGHT SHOULDER: Primary | ICD-10-CM

## 2018-11-15 PROCEDURE — 99024 POSTOP FOLLOW-UP VISIT: CPT | Performed by: ORTHOPAEDIC SURGERY

## 2018-11-15 NOTE — LETTER
[address]  Phone: [phone]  Fax: [phone]    Argelia Kendall MD        November 15, 2018     Patient: Diandra Esquivel   YOB: 1965   Date of Visit: 11/15/2018       To Whom It May Concern: It is my medical opinion that Carmen Laboy may return to work, but needs to present to continue in physical therapy 1-2 times per week for another 4 weeks from today. If you have any questions or concerns, please don't hesitate to call.     Sincerely,        Argelia Kendall MD
[] Moist heat     [] Core strengthening   [] Massage     [] Sports specific program:   [x] Cryotherapy      [] Electrical stimulation     [] Paraffin  [] Whirlpool  [] TENS    [x] Home exercise program (copy to patient). [x] Supervised physical therapy  Frequency: []  1x week  [x] 2x week  [] 3x week  [] Other:   Duration: [] 2 weeks   [x] 4 weeks  [] 6 weeks  [] Other:       Slowly introduce scapular exercises starting with rowing    May start cross body    Sincerely,          [facility], [place], PA-C  Physician Assistant in [address] [place] [address]  11/15/2018     This dictation was performed with a verbal recognition program (DRAGON) and it was checked for errors. It is possible that there are still dictated errors within this office note. If so, please bring any errors to my attention for an addendum. All efforts were made to ensure that this office note is accurate.

## 2018-11-15 NOTE — PROGRESS NOTES
History of Present Illness:  Kate Begum is a pleasant 52 y.o. male 8 weeks status post right shoulder arthroscopic stabilization for type IIIB acromioclavicular joint separation on 9/21/2018 by Dr. Jesenia Banerjee. Reports his shoulder is doing much better now and has almost no pain. This is resulting from a work-related injury. His occupation is as a . He is required to travel extensively for work. He continues to be compliant with physical therapy and has made significant gains in the last few weeks. He has not yet returned to work. No new injuries reported. No fever, chills, flulike symptoms. Medical History:  Patient's medications, allergies, past medical, surgical, social and family histories were reviewed and updated as appropriate. Pertinent items are noted in HPI  Review of systems reviewed from Patient History Form dated on 9/6/2018 and available in the patient's chart under the Media tab. Vital Signs:  Vitals:    11/15/18 0932   BP: 133/83   Pulse: 73         Constitutional: In no apparent distress. Normal affect. Alert and oriented X3 and is cooperative. RIGHT Shoulder Examination:    Inspection: Incisions well healed. No indication of infection. No dehiscence of incision sites. No diffuse erythema. No drainage. Palpation:  Nontender to light touch. Smooth movement with rotation of glenohumeral joint. Active Range of Motion: Forward flexion 140. Internal rotation to lower lumbar region. Passive Range of Motion:  Well tolerated    Strength: 5/5 external and internal rotation    Special Tests:  Distally neurovascularly intact. Radiology:     No new XR obtained today         Assessment :  Kate Begum is a 52 y.o. male 8 weeks status post right shoulder arthroscopic stabilization for type IIB acromioclavicular joint separation on 9/21/2018.  He continues to do terrific and we can begin his transition back to work.    Impression:  Encounter Diagnoses   Name Primary?  S/P arthroscopy of right shoulder Yes    Right shoulder pain, unspecified chronicity        Office Procedures:  Orders Placed This Encounter   Procedures    External Referral To Physical Therapy     Referral Priority:   Routine     Referral Type:   Eval and Treat     Referral Reason:   Specialty Services Required     Requested Specialty:   Physical Therapy     Number of Visits Requested:   1       Treatment Plan:  Continue in physical therapy focusing on forward flexion and can begin reaching across body. He is okay to return to work today, but needs to be present to continue in physical therapy 1-2 times per weeks x4 more weeks. Followup in 4-6 weeks or sooner if needed. All questions were answered to patient's satisfaction and was encouraged to call with any further questions or concerns. Isabel Michel is in agreement with this plan. [address], PA-C  11/15/2018     During this examination, I, [address], PA-C, functioned as a scribe for Dr. Fanny Bamberger. The history taking and physical examination were performed by Dr. Fanny Bamberger. All counseling during the appointment was performed between the patient and Dr. Fanny Bamberger. 11/15/2018 9:45 AM      This dictation was performed with a verbal recognition program (DRAGON) and it was checked for errors. It is possible that there are still dictated errors within this office note. If so, please bring any errors to my attention for an addendum. All efforts were made to ensure that this office note is accurate.  ________________________  I, Dr. Marlena Quick, personally performed the services described in this documentation as described by [address] [place] [address] Avenue, PA-C in my presence, and it is both accurate and complete. Samer S. Fanny Bamberger, MD, PhD  11/15/2018

## 2018-12-11 ENCOUNTER — OFFICE VISIT (OUTPATIENT)
Dept: ORTHOPEDIC SURGERY | Age: 53
End: 2018-12-11

## 2018-12-11 VITALS
HEIGHT: 67 IN | HEART RATE: 67 BPM | WEIGHT: 169.97 LBS | SYSTOLIC BLOOD PRESSURE: 119 MMHG | BODY MASS INDEX: 26.68 KG/M2 | DIASTOLIC BLOOD PRESSURE: 79 MMHG

## 2018-12-11 DIAGNOSIS — Z98.890 S/P SHOULDER SURGERY: Primary | ICD-10-CM

## 2018-12-11 PROCEDURE — 99024 POSTOP FOLLOW-UP VISIT: CPT | Performed by: ORTHOPAEDIC SURGERY

## 2018-12-11 NOTE — PROGRESS NOTES
History of Present Illness:  Karine Bennett is a pleasant 52 y.o. male 2.5 months status post right shoulder arthroscopic stabilization for type IIIB acromioclavicular joint separation on 9/21/2018 by Dr. Collette Robson. He has transitioned from formal physical therapy to home exercises. He has also returned work without complication. No new injuries reported. Medical History:  Patient's medications, allergies, past medical, surgical, social and family histories were reviewed and updated as appropriate. Pertinent items are noted in HPI  Review of systems reviewed from Patient History Form dated on 9/6/2018 and available in the patient's chart under the Media tab. Vital Signs:  Vitals:    12/11/18 0858   BP: 119/79   Pulse: 67         Constitutional: In no apparent distress. Normal affect. Alert and oriented X3 and is cooperative. RIGHT Shoulder Examination:    Inspection: Well healed incision. Benign without gross deformity    Palpation:  No point tenderness. Nontender to light touch    Active Range of Motion: Forward elevation 160. 50 external rotation with elbow at side. Internal rotation to upper lumbar region. Passive Range of Motion:  deferred    Strength:  5/5 supraspinatus, 4+/5 external rotation, and internal rotation. Special Tests:  15 cross arm without pain. Negative Jobes. Distally neurovascularly intact      Radiology:     No new XR obtained today         Assessment :  Karine Bennett is a 52 y.o. male 2.5 months status post right shoulder arthroscopic stabilization for type IIB acromioclavicular joint separation on 9/21/2018. He continues to do terrific and we can begin his transition back to work. He now has a very slight AC joint separation that is only visible with shirt off. Impression:  Encounter Diagnosis   Name Primary?  S/P shoulder surgery Yes       Office Procedures:  No orders of the defined types were placed in this encounter.       Plan: Continue

## 2019-04-02 ENCOUNTER — OFFICE VISIT (OUTPATIENT)
Dept: ORTHOPEDIC SURGERY | Age: 54
End: 2019-04-02
Payer: COMMERCIAL

## 2019-04-02 VITALS
HEART RATE: 71 BPM | BODY MASS INDEX: 26.68 KG/M2 | WEIGHT: 169.97 LBS | HEIGHT: 67 IN | DIASTOLIC BLOOD PRESSURE: 81 MMHG | SYSTOLIC BLOOD PRESSURE: 123 MMHG

## 2019-04-02 DIAGNOSIS — Z98.890 S/P ARTHROSCOPY OF RIGHT SHOULDER: Primary | ICD-10-CM

## 2019-04-02 DIAGNOSIS — S43.101A SEPARATION OF RIGHT ACROMIOCLAVICULAR JOINT, INITIAL ENCOUNTER: ICD-10-CM

## 2019-04-02 PROCEDURE — 99213 OFFICE O/P EST LOW 20 MIN: CPT | Performed by: ORTHOPAEDIC SURGERY

## 2019-04-02 NOTE — PROGRESS NOTES
History of Present Illness:  Heath Colindres is a pleasant, 52 y.o., male, here today for follow of up of right shoulder. He is nearly 7 months out following a right shoulder arthroscopic stabilization for type IIIB acromioclavicular joint separation on 9/21/2018. He is doing very well. He continues to work as a . He does note some weakness with certain activities however this continues to improve. He reports no new injuries or setbacks. Medical History:  Patient's medications, allergies, past medical, surgical, social and family histories were reviewed and updated as appropriate. Review of Systems  A 14 point review of systems was completed by the patient on 9/6/2018 and is available in the media section of the scanned medical record and was reviewed on 4/2/2019. The review is negative with the exception of those things mentioned in the HPI and Past Medical History    Vital Signs:  Vitals:    04/02/19 0839   BP: 123/81   Pulse: 71       General/Appearance: Alert and oriented and in no apparent distress. Skin:  There are no skin lesions, cellulitis, or extreme edema. The patient has warm and well-perfused Bilateral upper extremities with brisk capillary refill. RIGHT Shoulder Exam:  Inspection:  No gross deformities, no signs of infection. Palpation:  Nontender to light palpation    Active Range of Motion: Forward Elevation 170, Abduction 170, External Rotation 70 bilaterally , Internal Rotation T7    Passive Range of Motion: Deferred    Strength: Deferred    Special Tests:  No Александр muscle deformity. Neurovascular: Sensation to light touch is intact, no motor deficits, palpable radial pulses 2+    Self assessment questionnaires were completed today. Radiology:     No new XR obtained at this time.           Assessment :  Mr. Heath Colindres is a pleasant, 52 y.o. yr old patient who is 7 months out following a right shoulder arthroscopic stabilization for type III acromioclavicular joint separation on 9/21/2018. Impression:  Encounter Diagnoses   Name Primary?  S/P arthroscopy of right shoulder Yes    Separation of right acromioclavicular joint, initial encounter        Office Procedures:  No orders of the defined types were placed in this encounter. Treatment Plan:  Wade Lala is doing very well. He should continue to work on his home exercise program to build strength. He will continue his daily activities as tolerated. We will see Patrick Vo back as needed. All questions were answered to patient's satisfaction and was encouraged to call with any further questions or concerns. Kelley Arriola is in agreement with this plan. 4/2/2019  9:04 AM      Dustin Velez ATC  Orthopaedic Sports Medicine     During this examination, I, Dustin Velez ATC, functioned as a scribe for Dr. Amisha Hays. The history taking and physical examination were performed by Dr. Chichi Villarreal. All counsleing during the appointment was performed between the patient and Dr. Chichi Villarreal. 4/2/19   __________________  I, Dr. Amisha Hays, personally performed the services described in this documentation as described by Dustin Velez ATC in my presence, and it is both accurate and complete. Leidy Villarreal MD, PhD  4/2/2019

## 2019-09-10 ENCOUNTER — TELEPHONE (OUTPATIENT)
Dept: ORTHOPEDIC SURGERY | Age: 54
End: 2019-09-10

## (undated) DEVICE — BLADE SHV L13CM DIA5MM EXCALIBUR AGG COOLCUT

## (undated) DEVICE — SUTURE MCRYL SZ 4-0 L27IN ABSRB UD L19MM PS-2 1/2 CIR PRIM Y426H

## (undated) DEVICE — DRAPE 70X60IN SPLIT IMPERV ADHES STRIP

## (undated) DEVICE — GOWN,SIRUS,POLYRNF,SETINSLV,XL,20/CS: Brand: MEDLINE

## (undated) DEVICE — TUBING PMP L6FT CONT WAVE EXTN

## (undated) DEVICE — TUBING FLD MGMT Y DBL SPIK DUALWAVE

## (undated) DEVICE — 3M™ STERI-DRAPE™ U-DRAPE 1015: Brand: STERI-DRAPE™

## (undated) DEVICE — CANNULA ARTHSCP L7CM ID8.25MM TRNSLUC THRD FLX W/ NO SQUIRT

## (undated) DEVICE — 3M™ STERI-DRAPE™ U-DRAPE 1067 1067 5/BX 4BX/CS/CTN&#X20;: Brand: STERI-DRAPE™

## (undated) DEVICE — COVER LT HNDL BLU PLAS

## (undated) DEVICE — DUP USE 341662 MASK ERIN DISP FOR USE W/TENET BEACH CHAR

## (undated) DEVICE — PROTECTOR UNIV FOAM EXTREMITY DEVON

## (undated) DEVICE — STOCKINETTE ORTH W9XL36IN COT 2 PLY HLLW FOR HANDLING LMB

## (undated) DEVICE — CHLORAPREP 26ML ORANGE

## (undated) DEVICE — SPONGE GZ W4XL8IN COT WVN 12 PLY

## (undated) DEVICE — CANNULA ARTHSCP L7CM DIA7MM TRNSLUC THRD FLX W/ NO SQUIRT

## (undated) DEVICE — PACK,BASIC: Brand: MEDLINE

## (undated) DEVICE — 3M™ WARMING BLANKET, LOWER BODY, 10 PER CASE, 42568: Brand: BAIR HUGGER™

## (undated) DEVICE — SURGICAL SET UP - SURE SET: Brand: MEDLINE INDUSTRIES, INC.

## (undated) DEVICE — GAUZE,SPONGE,4"X4",16PLY,XRAY,STRL,LF: Brand: MEDLINE

## (undated) DEVICE — 3M™ IOBAN™ 2 ANTIMICROBIAL INCISE DRAPE 6640EZ: Brand: IOBAN™ 2

## (undated) DEVICE — CANNULA NSL AD TBNG L7FT PVC STR NONFLARED PRNG O2 DEL W STD

## (undated) DEVICE — PACK SURG PROC SHLDR ARTHRO ST ORTHOARTS

## (undated) DEVICE — SURE SET-DOUBLE BASIN-LF: Brand: MEDLINE INDUSTRIES, INC.

## (undated) DEVICE — NEEDLE SPNL L3.5IN PNK HUB S STL REG WALL FIT STYL W/ QNCKE

## (undated) DEVICE — MEDI-VAC NON-CONDUCTIVE SUCTION TUBING: Brand: CARDINAL HEALTH

## (undated) DEVICE — 1010 S-DRAPE TOWEL DRAPE 10/BX: Brand: STERI-DRAPE™

## (undated) DEVICE — PAD,ABDOMINAL,5"X9",ST,LF,25/BX: Brand: MEDLINE INDUSTRIES, INC.

## (undated) DEVICE — GLOVE SURG SZ 8 L12IN FNGR THK79MIL GRN LTX FREE

## (undated) DEVICE — COVER,MAYO STAND,XL,STERILE: Brand: MEDLINE

## (undated) DEVICE — PASSER SUT DIA1.8MM 25DEG L TIGHT CRV STIFF SHFT SHRP

## (undated) DEVICE — PROBE ABLAT XL 90DEG ASPIR BPLR RF 1 PC ELECTRD ERGO HNDL

## (undated) DEVICE — STRIP,CLOSURE,WOUND,MEDI-STRIP,1/2X4: Brand: MEDLINE

## (undated) DEVICE — SOLUTION IV 1000ML 0.9% SOD CHL

## (undated) DEVICE — SUTURE FIBERTAPE FIBERWIRE SZ 2-0 30IN NONABSORB BLU AR72377

## (undated) DEVICE — GLOVE SURG SZ 8 L12IN FNGR THK75MIL WHT LTX POLYMER BEAD

## (undated) DEVICE — GARMENT,MEDLINE,DVT,INT,CALF,MED, GEN2: Brand: MEDLINE

## (undated) DEVICE — PAD DRY FLOOR ABS 32X58IN GRN

## (undated) DEVICE — SYRINGE IRRIG 60ML SFT PLIABLE BLB EZ TO GRP 1 HND USE W/

## (undated) DEVICE — PUDDLEVAC FLOOR SUCTION DEVICE: Brand: PUDDLEVAC

## (undated) DEVICE — SUTURE FIBERWIRE SZ 2 W/ TAPERED NEEDLE BLUE L38IN NONABSORB BLU L26.5MM 1/2 CIRCLE AR7200

## (undated) DEVICE — SUTURE FIBERWIRE FIBERSTICK SZ 2-0 L50/12IN NONABSORBABLE AR7209

## (undated) DEVICE — NEEDLE SUT 1/2 CIR TAPR TIP TNSL STRL SZ 1 DAVIS

## (undated) DEVICE — ELECTRODE PT RET AD L9FT HI MOIST COND ADH HYDRGEL CORDED

## (undated) DEVICE — PENCIL ES L3M ROCK SWCH S STL HEX LOK BLDE ELECTRD HOLSTER

## (undated) DEVICE — BUR SHAVER 5 MMX13 CM 8 FLUT OVL FOR AGGRESSIVE BNE COOLCUT

## (undated) DEVICE — GOWN,SIRUS,POLYRNF,BRTHSLV,XLN/XXL,18/CS: Brand: MEDLINE

## (undated) DEVICE — SUTURE TIGERTAPE TIGERWIRE SZ 2-0 L30IN NONABSORBABLE AR72377T

## (undated) DEVICE — BLADE ES L2.75IN ELASTOMERIC COAT DURABLE BEND UPTO 90DEG

## (undated) DEVICE — INTENDED FOR TISSUE SEPARATION, AND OTHER PROCEDURES THAT REQUIRE A SHARP SURGICAL BLADE TO PUNCTURE OR CUT.: Brand: BARD-PARKER ® CARBON RIB-BACK BLADES

## (undated) DEVICE — KIT SHLDR STBL MARCO FOR SPIDER LIMB POS

## (undated) DEVICE — GOWN,SIRUS,POLYRNF,SETINSLV,L,20/CS: Brand: MEDLINE

## (undated) DEVICE — TURNOVER KIT RM INF CTRL TECH

## (undated) DEVICE — BANDAGE COBAN 4 IN COMPR W4INXL5YD FOAM COHESIVE QUIK STK SELF ADH SFT

## (undated) DEVICE — SYSTEM SKIN CLSR 22CM DERMBND PRINEO

## (undated) DEVICE — SUTURE PDS II SZ 1 L27IN ABSRB VLT CT-1 L36MM 1/2 CIR Z341H

## (undated) DEVICE — TUBING PMP L16FT MAIN DISP FOR AR-6400 AR-6475

## (undated) DEVICE — EYE PROTECTOR FOAM MEDICHOICE